# Patient Record
Sex: MALE | Race: BLACK OR AFRICAN AMERICAN | Employment: UNEMPLOYED
[De-identification: names, ages, dates, MRNs, and addresses within clinical notes are randomized per-mention and may not be internally consistent; named-entity substitution may affect disease eponyms.]

---

## 2024-04-08 ENCOUNTER — OFFICE VISIT (OUTPATIENT)
Dept: PRIMARY CARE CLINIC | Facility: CLINIC | Age: 19
End: 2024-04-08
Payer: MEDICAID

## 2024-04-08 VITALS
OXYGEN SATURATION: 99 % | HEART RATE: 104 BPM | TEMPERATURE: 98.4 F | SYSTOLIC BLOOD PRESSURE: 111 MMHG | WEIGHT: 158.7 LBS | DIASTOLIC BLOOD PRESSURE: 69 MMHG | BODY MASS INDEX: 25.51 KG/M2 | HEIGHT: 66 IN

## 2024-04-08 DIAGNOSIS — G89.29 CHRONIC RIGHT SHOULDER PAIN: ICD-10-CM

## 2024-04-08 DIAGNOSIS — Z83.49 FAMILY HISTORY OF THYROID DISEASE: ICD-10-CM

## 2024-04-08 DIAGNOSIS — M41.125 ADOLESCENT IDIOPATHIC SCOLIOSIS OF THORACOLUMBAR REGION: ICD-10-CM

## 2024-04-08 DIAGNOSIS — J30.1 SEASONAL ALLERGIC RHINITIS DUE TO POLLEN: ICD-10-CM

## 2024-04-08 DIAGNOSIS — Z76.89 ENCOUNTER TO ESTABLISH CARE WITH NEW DOCTOR: Primary | ICD-10-CM

## 2024-04-08 DIAGNOSIS — F84.0 ACTIVE AUTISTIC DISORDER: ICD-10-CM

## 2024-04-08 DIAGNOSIS — Z13.220 SCREENING FOR LIPID DISORDERS: ICD-10-CM

## 2024-04-08 DIAGNOSIS — Z83.3 FAMILY HISTORY OF DIABETES MELLITUS: ICD-10-CM

## 2024-04-08 DIAGNOSIS — R51.9 FREQUENT HEADACHES: ICD-10-CM

## 2024-04-08 DIAGNOSIS — G56.03 BILATERAL CARPAL TUNNEL SYNDROME: ICD-10-CM

## 2024-04-08 DIAGNOSIS — Z79.899 ENCOUNTER FOR LONG-TERM (CURRENT) USE OF MEDICATIONS: ICD-10-CM

## 2024-04-08 DIAGNOSIS — F90.0 ATTENTION DEFICIT HYPERACTIVITY DISORDER (ADHD), PREDOMINANTLY INATTENTIVE TYPE: ICD-10-CM

## 2024-04-08 DIAGNOSIS — M25.561 CHRONIC PAIN OF RIGHT KNEE: ICD-10-CM

## 2024-04-08 DIAGNOSIS — M25.511 CHRONIC RIGHT SHOULDER PAIN: ICD-10-CM

## 2024-04-08 DIAGNOSIS — Z80.1 FAMILY HISTORY OF LUNG CANCER: ICD-10-CM

## 2024-04-08 DIAGNOSIS — L81.9 HYPERPIGMENTATION OF SKIN: ICD-10-CM

## 2024-04-08 DIAGNOSIS — G89.29 CHRONIC PAIN OF RIGHT KNEE: ICD-10-CM

## 2024-04-08 DIAGNOSIS — E55.9 VITAMIN D DEFICIENCY: ICD-10-CM

## 2024-04-08 DIAGNOSIS — N39.0 RECURRENT UTI: ICD-10-CM

## 2024-04-08 LAB
25(OH)D3 SERPL-MCNC: 46.3 NG/ML (ref 30–100)
ALBUMIN SERPL-MCNC: 3.9 G/DL (ref 3.5–5)
ALBUMIN/GLOB SERPL: 1.1 (ref 0.4–1.6)
ALP SERPL-CCNC: 71 U/L (ref 50–136)
ALT SERPL-CCNC: 25 U/L (ref 12–65)
ANION GAP SERPL CALC-SCNC: 3 MMOL/L (ref 2–11)
AST SERPL-CCNC: 18 U/L (ref 15–37)
BASOPHILS # BLD: 0.1 K/UL (ref 0–0.2)
BASOPHILS NFR BLD: 1 % (ref 0–2)
BILIRUB SERPL-MCNC: <0.1 MG/DL (ref 0.2–1.1)
BILIRUBIN, URINE, POC: NEGATIVE
BLOOD URINE, POC: NEGATIVE
BUN SERPL-MCNC: 15 MG/DL (ref 6–23)
CALCIUM SERPL-MCNC: 10 MG/DL (ref 8.3–10.4)
CHLORIDE SERPL-SCNC: 110 MMOL/L (ref 103–113)
CHOLEST SERPL-MCNC: 184 MG/DL
CO2 SERPL-SCNC: 26 MMOL/L (ref 21–32)
CREAT SERPL-MCNC: 1 MG/DL (ref 0.8–1.5)
DIFFERENTIAL METHOD BLD: NORMAL
EOSINOPHIL # BLD: 0.1 K/UL (ref 0–0.8)
EOSINOPHIL NFR BLD: 1 % (ref 0.5–7.8)
ERYTHROCYTE [DISTWIDTH] IN BLOOD BY AUTOMATED COUNT: 11.9 % (ref 11.9–14.6)
EST. AVERAGE GLUCOSE BLD GHB EST-MCNC: 108 MG/DL
GLOBULIN SER CALC-MCNC: 3.5 G/DL (ref 2.8–4.5)
GLUCOSE SERPL-MCNC: 97 MG/DL (ref 65–100)
GLUCOSE URINE, POC: NEGATIVE
HBA1C MFR BLD: 5.4 % (ref 4.8–5.6)
HCT VFR BLD AUTO: 44.1 % (ref 41.1–50.3)
HDLC SERPL-MCNC: 52 MG/DL (ref 40–60)
HDLC SERPL: 3.5
HGB BLD-MCNC: 14.1 G/DL (ref 13.6–17.2)
IMM GRANULOCYTES # BLD AUTO: 0 K/UL (ref 0–0.5)
IMM GRANULOCYTES NFR BLD AUTO: 0 % (ref 0–5)
KETONES, URINE, POC: NORMAL
LDLC SERPL CALC-MCNC: 119.2 MG/DL
LEUKOCYTE ESTERASE, URINE, POC: NEGATIVE
LYMPHOCYTES # BLD: 3 K/UL (ref 0.5–4.6)
LYMPHOCYTES NFR BLD: 39 % (ref 13–44)
MCH RBC QN AUTO: 28.8 PG (ref 26.1–32.9)
MCHC RBC AUTO-ENTMCNC: 32 G/DL (ref 31.4–35)
MCV RBC AUTO: 90.2 FL (ref 82–102)
MONOCYTES # BLD: 0.4 K/UL (ref 0.1–1.3)
MONOCYTES NFR BLD: 5 % (ref 4–12)
NEUTS SEG # BLD: 4 K/UL (ref 1.7–8.2)
NEUTS SEG NFR BLD: 54 % (ref 43–78)
NITRITE, URINE, POC: NEGATIVE
NRBC # BLD: 0 K/UL (ref 0–0.2)
PH, URINE, POC: 5.5 (ref 4.6–8)
PLATELET # BLD AUTO: 326 K/UL (ref 150–450)
PMV BLD AUTO: 9.5 FL (ref 9.4–12.3)
POTASSIUM SERPL-SCNC: 4.2 MMOL/L (ref 3.5–5.1)
PROT SERPL-MCNC: 7.4 G/DL (ref 6.3–8.2)
PROTEIN,URINE, POC: NEGATIVE
RBC # BLD AUTO: 4.89 M/UL (ref 4.23–5.6)
SODIUM SERPL-SCNC: 139 MMOL/L (ref 136–146)
SPECIFIC GRAVITY, URINE, POC: 1.03 (ref 1–1.03)
T4 FREE SERPL-MCNC: 0.9 NG/DL (ref 0.78–1.33)
TRIGL SERPL-MCNC: 64 MG/DL (ref 35–150)
TSH, 3RD GENERATION: 1.01 UIU/ML (ref 0.36–3.74)
URINALYSIS CLARITY, POC: CLEAR
URINALYSIS COLOR, POC: YELLOW
UROBILINOGEN, POC: NORMAL
VLDLC SERPL CALC-MCNC: 12.8 MG/DL (ref 6–23)
WBC # BLD AUTO: 7.6 K/UL (ref 4.3–11.1)

## 2024-04-08 PROCEDURE — 81003 URINALYSIS AUTO W/O SCOPE: CPT | Performed by: FAMILY MEDICINE

## 2024-04-08 PROCEDURE — 99204 OFFICE O/P NEW MOD 45 MIN: CPT | Performed by: FAMILY MEDICINE

## 2024-04-08 RX ORDER — TRIAMCINOLONE ACETONIDE 1 MG/G
CREAM TOPICAL
Qty: 454 G | Refills: 1 | Status: SHIPPED | OUTPATIENT
Start: 2024-04-08

## 2024-04-08 RX ORDER — TRAZODONE HYDROCHLORIDE 50 MG/1
50 TABLET ORAL NIGHTLY
COMMUNITY
Start: 2024-03-07

## 2024-04-08 RX ORDER — CETIRIZINE HYDROCHLORIDE 10 MG/1
10 TABLET ORAL DAILY
COMMUNITY
End: 2024-04-08 | Stop reason: SDUPTHER

## 2024-04-08 RX ORDER — MELOXICAM 15 MG/1
15 TABLET ORAL DAILY
Qty: 30 TABLET | Refills: 5 | Status: SHIPPED | OUTPATIENT
Start: 2024-04-08

## 2024-04-08 RX ORDER — HYDROXYZINE PAMOATE 25 MG/1
25 CAPSULE ORAL 2 TIMES DAILY
COMMUNITY

## 2024-04-08 RX ORDER — GUANFACINE 4 MG/1
4 TABLET, EXTENDED RELEASE ORAL EVERY MORNING
COMMUNITY
Start: 2024-03-07

## 2024-04-08 RX ORDER — METHYLPHENIDATE HYDROCHLORIDE 36 MG/1
36 TABLET ORAL EVERY MORNING
COMMUNITY
Start: 2024-03-25

## 2024-04-08 RX ORDER — ERGOCALCIFEROL 1.25 MG/1
50000 CAPSULE ORAL WEEKLY
COMMUNITY
Start: 2024-03-18

## 2024-04-08 RX ORDER — KETOCONAZOLE 20 MG/G
CREAM TOPICAL DAILY
COMMUNITY
Start: 2023-06-06 | End: 2024-04-08

## 2024-04-08 RX ORDER — CLONIDINE HYDROCHLORIDE 0.2 MG/1
0.2 TABLET ORAL NIGHTLY
COMMUNITY

## 2024-04-08 RX ORDER — DEXMETHYLPHENIDATE HYDROCHLORIDE 10 MG/1
10 TABLET ORAL DAILY
COMMUNITY

## 2024-04-08 RX ORDER — KETOCONAZOLE 20 MG/ML
SHAMPOO TOPICAL
Qty: 120 ML | Refills: 5 | Status: CANCELLED | OUTPATIENT
Start: 2024-04-08

## 2024-04-08 RX ORDER — CETIRIZINE HYDROCHLORIDE 10 MG/1
10 TABLET ORAL DAILY
Qty: 30 TABLET | Refills: 5 | Status: SHIPPED | OUTPATIENT
Start: 2024-04-08

## 2024-04-08 RX ORDER — CHOLECALCIFEROL (VITAMIN D3) 125 MCG
5 CAPSULE ORAL NIGHTLY
COMMUNITY
Start: 2024-04-03

## 2024-04-08 SDOH — ECONOMIC STABILITY: FOOD INSECURITY: WITHIN THE PAST 12 MONTHS, THE FOOD YOU BOUGHT JUST DIDN'T LAST AND YOU DIDN'T HAVE MONEY TO GET MORE.: PATIENT DECLINED

## 2024-04-08 SDOH — ECONOMIC STABILITY: INCOME INSECURITY: HOW HARD IS IT FOR YOU TO PAY FOR THE VERY BASICS LIKE FOOD, HOUSING, MEDICAL CARE, AND HEATING?: PATIENT DECLINED

## 2024-04-08 SDOH — ECONOMIC STABILITY: FOOD INSECURITY: WITHIN THE PAST 12 MONTHS, YOU WORRIED THAT YOUR FOOD WOULD RUN OUT BEFORE YOU GOT MONEY TO BUY MORE.: PATIENT DECLINED

## 2024-04-08 SDOH — ECONOMIC STABILITY: HOUSING INSECURITY
IN THE LAST 12 MONTHS, WAS THERE A TIME WHEN YOU DID NOT HAVE A STEADY PLACE TO SLEEP OR SLEPT IN A SHELTER (INCLUDING NOW)?: PATIENT DECLINED

## 2024-04-08 ASSESSMENT — PATIENT HEALTH QUESTIONNAIRE - PHQ9: DEPRESSION UNABLE TO ASSESS: FUNCTIONAL CAPACITY MOTIVATION LIMITS ACCURACY

## 2024-04-08 NOTE — PROGRESS NOTES
EXAM:  Visit Vitals  /69   Pulse (!) 104   Temp 98.4 °F (36.9 °C) (Temporal)   Ht 1.664 m (5' 5.5\")   Wt 72 kg (158 lb 11.2 oz)   SpO2 99%   BMI 26.01 kg/m²        Physical Exam  Vitals and nursing note reviewed.   Constitutional:       Appearance: Normal appearance.   HENT:      Head: Normocephalic and atraumatic.      Nose: Nose normal.      Mouth/Throat:      Mouth: Mucous membranes are moist.   Eyes:      Extraocular Movements: Extraocular movements intact.      Pupils: Pupils are equal, round, and reactive to light.   Cardiovascular:      Rate and Rhythm: Normal rate and regular rhythm.      Pulses: Normal pulses.      Heart sounds: Normal heart sounds.   Pulmonary:      Effort: Pulmonary effort is normal.      Breath sounds: Normal breath sounds.   Abdominal:      General: Abdomen is flat. Bowel sounds are normal.      Palpations: Abdomen is soft.   Musculoskeletal:         General: Normal range of motion.      Right shoulder: Tenderness present. Decreased strength.      Left shoulder: Normal.      Right wrist: Tenderness present.      Left wrist: Tenderness present.      Cervical back: Normal range of motion and neck supple.      Right knee: Tenderness present.      Left knee: Normal.   Skin:     General: Skin is warm and dry.   Neurological:      General: No focal deficit present.      Mental Status: He is alert and oriented to person, place, and time.      Cranial Nerves: Cranial nerves 2-12 are intact.      Sensory: Sensation is intact.      Motor: Motor function is intact.      Coordination: Coordination is intact.      Gait: Gait is intact.   Psychiatric:         Attention and Perception: He is inattentive.         Mood and Affect: Mood is depressed. Affect is flat.         Behavior: Behavior is slowed and withdrawn.         PHQ:      4/8/2024     3:21 PM   PHQ-9    Depression Unable to Assess Functional capacity motivation limits accuracy       LABS  Results for orders placed or performed in visit

## 2024-05-05 NOTE — PROGRESS NOTES
as needed for migraine abortive therapy. May repeat for 1 dose in 2 hours if needed. Not to exceed 200mg/24 hours.     Headache Education:   Instructed the patient on over-the-counter headache management medications including: CoQ10, magnesium oxide, riboflavin and butterbur.  To avoid a pain medication overuse headache trying not to take pain medicines more than 3 doses a week.   Avoid use of Fioricet or opioids to treat headaches as this can increase risk for rebound headaches.   To help relieve headache symptoms without taking pain medicine lie down under darkroom and drink glass of water.  Consider lifestyle modification including good sleep hygiene, routine medial schedules, regular exercise and managing triggers.  Keep a headache diary  to reveal triggers and possible patterns.  Triggers may be: Food, stress, perfumes, alcohol, or even chocolate.  Drink plenty of water and try to get 8 hours of sleep each night to reduce risk factors that may cause headaches.    -     divalproex (DEPAKOTE) 250 MG DR tablet; Take 1 tablet by mouth 2 times daily    Recurrent syncope  Will obtain MRI and EEG.   Differential: syncope v. Seizure v. PNES  -     MRI BRAIN WO CONTRAST; Future  -     Ambulatory referral to Neurology    Anxiety and depression  Followed by [facility]. Stable and denies SI.   -     divalproex (DEPAKOTE) 250 MG DR tablet; Take 1 tablet by mouth 2 times daily      Follow up in 8 weeks or sooner if needed     Lizeth Basurto [facility] Neurology  [address] [place] [address], [address] [place] [address]  Phone: [phone]

## 2024-05-06 ENCOUNTER — OFFICE VISIT (OUTPATIENT)
Age: 19
End: 2024-05-06
Payer: MEDICAID

## 2024-05-06 ENCOUNTER — OFFICE VISIT (OUTPATIENT)
Dept: NEUROLOGY | Age: 19
End: 2024-05-06
Payer: MEDICAID

## 2024-05-06 VITALS — BODY MASS INDEX: 25.71 KG/M2 | WEIGHT: 160 LBS | HEIGHT: 66 IN

## 2024-05-06 VITALS
WEIGHT: 166 LBS | BODY MASS INDEX: 27.66 KG/M2 | HEIGHT: 65 IN | DIASTOLIC BLOOD PRESSURE: 83 MMHG | OXYGEN SATURATION: 97 % | HEART RATE: 118 BPM | SYSTOLIC BLOOD PRESSURE: 130 MMHG

## 2024-05-06 DIAGNOSIS — G43.709 CHRONIC MIGRAINE WITHOUT AURA WITHOUT STATUS MIGRAINOSUS, NOT INTRACTABLE: Primary | ICD-10-CM

## 2024-05-06 DIAGNOSIS — R55 RECURRENT SYNCOPE: ICD-10-CM

## 2024-05-06 DIAGNOSIS — M79.643 HAND PAIN, NOT ARTHRALGIA, UNSPECIFIED LATERALITY: Primary | ICD-10-CM

## 2024-05-06 DIAGNOSIS — F41.9 ANXIETY AND DEPRESSION: ICD-10-CM

## 2024-05-06 DIAGNOSIS — F32.A ANXIETY AND DEPRESSION: ICD-10-CM

## 2024-05-06 PROCEDURE — 99203 OFFICE O/P NEW LOW 30 MIN: CPT | Performed by: ORTHOPAEDIC SURGERY

## 2024-05-06 PROCEDURE — 99204 OFFICE O/P NEW MOD 45 MIN: CPT | Performed by: NURSE PRACTITIONER

## 2024-05-06 RX ORDER — DIVALPROEX SODIUM 250 MG/1
250 TABLET, DELAYED RELEASE ORAL 2 TIMES DAILY
Qty: 60 TABLET | Refills: 2 | Status: SHIPPED | OUTPATIENT
Start: 2024-05-06

## 2024-05-06 RX ORDER — CITALOPRAM 40 MG/1
40 TABLET ORAL DAILY
COMMUNITY

## 2024-05-06 ASSESSMENT — ENCOUNTER SYMPTOMS
VOMITING: 1
ALLERGIC/IMMUNOLOGIC NEGATIVE: 1
PHOTOPHOBIA: 1
NAUSEA: 1
RESPIRATORY NEGATIVE: 1

## 2024-05-06 ASSESSMENT — PATIENT HEALTH QUESTIONNAIRE - PHQ9
SUM OF ALL RESPONSES TO PHQ QUESTIONS 1-9: 2
SUM OF ALL RESPONSES TO PHQ QUESTIONS 1-9: 2
2. FEELING DOWN, DEPRESSED OR HOPELESS: SEVERAL DAYS
1. LITTLE INTEREST OR PLEASURE IN DOING THINGS: SEVERAL DAYS
SUM OF ALL RESPONSES TO PHQ9 QUESTIONS 1 & 2: 2
SUM OF ALL RESPONSES TO PHQ QUESTIONS 1-9: 2
SUM OF ALL RESPONSES TO PHQ QUESTIONS 1-9: 2

## 2024-05-06 NOTE — PATIENT INSTRUCTIONS
Headache Education:   Instructed the patient on over-the-counter headache management medications including: CoQ10, magnesium oxide, riboflavin, and butterbur.  To avoid a pain medication overuse headache trying not to take pain medicines more than 3 doses a week.   Avoid use of Fioricet or opioids to treat headaches as this can increase risk for rebound headaches.   To help relieve headache symptoms without taking pain medicine lie down under darkroom and drink glass of water.  Consider lifestyle modification including good sleep hygiene, routine medial schedules, regular exercise and managing triggers.  Keep a headache diary  to reveal triggers and possible patterns.  Triggers may be: Food, stress, perfumes, alcohol, or even chocolate.  Drink plenty of water and try to get 8 hours of sleep each night to reduce risk factors that may cause headaches.    Samples of Nurtec ODT 75 mg and Ubrelvy 100 mg provided to patient. Advised not to take medication on same day and to update office on efficacy.   Instructions:  Nurtec ODT 75 mg daily as needed for migraine abortive therapy. Not to exceed 75mg/24 hours.   Ubrelvy 100 mg daily as needed for migraine abortive therapy. May repeat for 1 dose in 2 hours if needed. Not to exceed 200mg/24 hours.

## 2024-05-07 NOTE — PROGRESS NOTES
Orthopaedic Hand Surgery Note    Name: Kameron Rowley  YOB: 2005  Gender: male  MRN: [id number]    CC: hand numbness      HPI: Patient is a 18 y.o. male with a chief complaint of bilateral hand cramping. He says it occurs intermittently with no particular alleviating or aggravating factors. He has difficulty describing his symptoms. He says he is unable to move the hands when this occurs. He has no numbness or tingling, and denies any finger locking or catching.  He denies any night time symptoms    ROS/Meds/PSH/PMH/FH/SH: I personally reviewed the patients standard intake form.  Pertinents are discussed In the HPI    Physical Examination:    Musculoskeletal:     Examination of the bilateral upper extremity demonstrates Normal sensation to light touch in the median distribution, normal sensation in ulnar and radial distribution, Negative carpal tunnel compression testing and Phalen testing, cap refill < 5 seconds in all fingers. Inspection reveals no thenar atrophy. Negative Tinel and elbow flexion compression test of the cubital tunnel, negative Tinel over Guyon's canal. Sensation to light touch in the ulnar 2 digits is normal with no intrinsic atrophy/weakness. No tenderness to palpation or masses noted in the forearm. No tenderness to palpation throughout the hands and wrists. Wrist and finger active and passive motion is full and pain free.     Imaging / Electrodiagnostic Tests:     none    Assessment:     ICD-10-CM    1. Hand pain, not arthralgia, unspecified laterality  M79.643 Ambulatory referral to Occupational Therapy          Plan:  We discussed the diagnosis and different treatment options. Based on the patient's description of symptoms, it sounds like he may be experiencing muscle spasms, but his complaints are fairly vague and physical exam today was completely benign. I recommended frequent breaks from repetitive activity, gentle stretching. History and physical exam is not consistent

## 2024-05-28 ENCOUNTER — OFFICE VISIT (OUTPATIENT)
Dept: ORTHOPEDIC SURGERY | Age: 19
End: 2024-05-28
Payer: MEDICAID

## 2024-05-28 DIAGNOSIS — M25.511 RIGHT SHOULDER PAIN, UNSPECIFIED CHRONICITY: Primary | ICD-10-CM

## 2024-05-28 DIAGNOSIS — M25.561 RIGHT KNEE PAIN, UNSPECIFIED CHRONICITY: ICD-10-CM

## 2024-05-28 PROCEDURE — 99204 OFFICE O/P NEW MOD 45 MIN: CPT | Performed by: ORTHOPAEDIC SURGERY

## 2024-05-28 RX ORDER — DICLOFENAC SODIUM 75 MG/1
75 TABLET, DELAYED RELEASE ORAL 2 TIMES DAILY
Qty: 28 TABLET | Refills: 0 | Status: CANCELLED | OUTPATIENT
Start: 2024-05-28

## 2024-05-28 RX ORDER — NAPROXEN 500 MG/1
500 TABLET ORAL 2 TIMES DAILY WITH MEALS
Qty: 28 TABLET | Refills: 0 | Status: SHIPPED | OUTPATIENT
Start: 2024-05-28
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
## 2024-06-10 ENCOUNTER — EVALUATION (OUTPATIENT)
Age: 19
End: 2024-06-10
Payer: MEDICAID

## 2024-06-10 DIAGNOSIS — M79.641 PAIN IN BOTH HANDS: Primary | ICD-10-CM

## 2024-06-10 DIAGNOSIS — M79.642 PAIN IN BOTH HANDS: Primary | ICD-10-CM

## 2024-06-10 DIAGNOSIS — M79.643 HAND PAIN, NOT ARTHRALGIA, UNSPECIFIED LATERALITY: ICD-10-CM

## 2024-06-10 DIAGNOSIS — R29.898 DECREASED GRIP STRENGTH: ICD-10-CM

## 2024-06-10 PROCEDURE — 97165 OT EVAL LOW COMPLEX 30 MIN: CPT | Performed by: OCCUPATIONAL THERAPIST

## 2024-06-10 PROCEDURE — 97110 THERAPEUTIC EXERCISES: CPT | Performed by: OCCUPATIONAL THERAPIST

## 2024-06-10 NOTE — PROGRESS NOTES
[facility] ORTHOPAEDICS  [address] [place] [address]  Dept: [phone]      Occupational Therapy Initial Assessment     Referring MD: Diane Sequeira MD    Diagnosis:     ICD-10-CM    1. Pain in both hands  M79.641     M79.642       2. Decreased  strength  R29.898          Therapy precautions: None    History of injury/onset : Pt began having onset of bilateral hand \"cramping\" and pain about 1 year ago, unspecified origin.     Payor: Payor: [employer] [place] MEDICAID /  /  /  Billing pattern: Government- total time   Total Direct Treatment Time: 15 min                       Total In Office Time: 40 min  Modifier needed: No  Episode visit count:  1     Preferred Name:  Kameron     PERTINENT MEDICAL HISTORY     PMHX & Meds:   Past Medical History:   Diagnosis Date    ADHD     Anxiety     Bipolar depression (HCC)     Depression     Oppositional defiant behavior     Scoliosis     Seasonal allergies    ,   Past Surgical History:   Procedure Laterality Date    BACK SURGERY  03/2022    scoliosis      Medications. : Reviewed in chart  Allergies: No Known Allergies     SUBJECTIVE     Current Symptoms/Chief complaints:   Chief Complaint   Patient presents with    Hand Pain    Finger Pain       Chief complaint/history of injury:     Number of Therapy Visits within past 90 days: 0  Nature of condition: Recurrent (multiple episodes of < 3 months)  Primary cause of current episode: Unspecified  Date symptoms began: about a year ago, pt unable to specify   Describe current symptoms: Pt hands lock up and feels like they can't move      How often do you feel symptoms? 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 A little bit  In general, would you say your current overall health is very

## 2024-07-01 ENCOUNTER — PROCEDURE VISIT (OUTPATIENT)
Dept: NEUROLOGY | Age: 19
End: 2024-07-01

## 2024-07-01 ENCOUNTER — OFFICE VISIT (OUTPATIENT)
Dept: NEUROLOGY | Age: 19
End: 2024-07-01
Payer: MEDICAID

## 2024-07-01 VITALS
WEIGHT: 168.8 LBS | HEART RATE: 104 BPM | SYSTOLIC BLOOD PRESSURE: 104 MMHG | DIASTOLIC BLOOD PRESSURE: 69 MMHG | OXYGEN SATURATION: 98 % | BODY MASS INDEX: 27.13 KG/M2 | HEIGHT: 66 IN

## 2024-07-01 DIAGNOSIS — F41.9 ANXIETY AND DEPRESSION: ICD-10-CM

## 2024-07-01 DIAGNOSIS — F32.A ANXIETY AND DEPRESSION: ICD-10-CM

## 2024-07-01 DIAGNOSIS — G43.709 CHRONIC MIGRAINE WITHOUT AURA WITHOUT STATUS MIGRAINOSUS, NOT INTRACTABLE: Primary | ICD-10-CM

## 2024-07-01 DIAGNOSIS — R55 RECURRENT SYNCOPE: ICD-10-CM

## 2024-07-01 PROCEDURE — 99214 OFFICE O/P EST MOD 30 MIN: CPT | Performed by: NURSE PRACTITIONER

## 2024-07-01 RX ORDER — DIVALPROEX SODIUM 250 MG/1
250 TABLET, DELAYED RELEASE ORAL 2 TIMES DAILY
Qty: 60 TABLET | Refills: 5 | Status: SHIPPED | OUTPATIENT
Start: 2024-07-01

## 2024-07-01 ASSESSMENT — PATIENT HEALTH QUESTIONNAIRE - PHQ9
9. THOUGHTS THAT YOU WOULD BE BETTER OFF DEAD, OR OF HURTING YOURSELF: SEVERAL DAYS
SUM OF ALL RESPONSES TO PHQ QUESTIONS 1-9: 6
1. LITTLE INTEREST OR PLEASURE IN DOING THINGS: SEVERAL DAYS
7. TROUBLE CONCENTRATING ON THINGS, SUCH AS READING THE NEWSPAPER OR WATCHING TELEVISION: NOT AT ALL
SUM OF ALL RESPONSES TO PHQ9 QUESTIONS 1 & 2: 2
SUM OF ALL RESPONSES TO PHQ QUESTIONS 1-9: 6
6. FEELING BAD ABOUT YOURSELF - OR THAT YOU ARE A FAILURE OR HAVE LET YOURSELF OR YOUR FAMILY DOWN: SEVERAL DAYS
10. IF YOU CHECKED OFF ANY PROBLEMS, HOW DIFFICULT HAVE THESE PROBLEMS MADE IT FOR YOU TO DO YOUR WORK, TAKE CARE OF THINGS AT HOME, OR GET ALONG WITH OTHER PEOPLE: SOMEWHAT DIFFICULT
8. MOVING OR SPEAKING SO SLOWLY THAT OTHER PEOPLE COULD HAVE NOTICED. OR THE OPPOSITE, BEING SO FIGETY OR RESTLESS THAT YOU HAVE BEEN MOVING AROUND A LOT MORE THAN USUAL: SEVERAL DAYS
SUM OF ALL RESPONSES TO PHQ QUESTIONS 1-9: 6
4. FEELING TIRED OR HAVING LITTLE ENERGY: SEVERAL DAYS
3. TROUBLE FALLING OR STAYING ASLEEP: NOT AT ALL
2. FEELING DOWN, DEPRESSED OR HOPELESS: SEVERAL DAYS
SUM OF ALL RESPONSES TO PHQ QUESTIONS 1-9: 5
5. POOR APPETITE OR OVEREATING: NOT AT ALL

## 2024-07-01 ASSESSMENT — COLUMBIA-SUICIDE SEVERITY RATING SCALE - C-SSRS
6. HAVE YOU EVER DONE ANYTHING, STARTED TO DO ANYTHING, OR PREPARED TO DO ANYTHING TO END YOUR LIFE?: NO
4. HAVE YOU HAD THESE THOUGHTS AND HAD SOME INTENTION OF ACTING ON THEM?: YES
5. HAVE YOU STARTED TO WORK OUT OR WORKED OUT THE DETAILS OF HOW TO KILL YOURSELF? DO YOU INTEND TO CARRY OUT THIS PLAN?: NO
2. HAVE YOU ACTUALLY HAD ANY THOUGHTS OF KILLING YOURSELF?: YES
3. HAVE YOU BEEN THINKING ABOUT HOW YOU MIGHT KILL YOURSELF?: YES
1. WITHIN THE PAST MONTH, HAVE YOU WISHED YOU WERE DEAD OR WISHED YOU COULD GO TO SLEEP AND NOT WAKE UP?: NO

## 2024-07-01 ASSESSMENT — ENCOUNTER SYMPTOMS
NAUSEA: 1
ALLERGIC/IMMUNOLOGIC NEGATIVE: 1
RESPIRATORY NEGATIVE: 1
PHOTOPHOBIA: 1
VOMITING: 1

## 2024-07-01 NOTE — PATIENT INSTRUCTIONS
Headache Education:   Instructed the patient on over-the-counter headache management medications including: CoQ10, magnesium oxide, riboflavin and butterbur.  To avoid a pain medication overuse headache trying not to take pain medicines more than 3 doses a week.   Avoid use of Fioricet or opioids to treat headaches as this can increase risk for rebound headaches.   To help relieve headache symptoms without taking pain medicine lie down under darkroom and drink glass of water.  Consider lifestyle modification including good sleep hygiene, routine medial schedules, regular exercise and managing triggers.  Keep a headache diary  to reveal triggers and possible patterns.  Triggers may be: Food, stress, perfumes, alcohol, or even chocolate.  Drink plenty of water and try to get 8 hours of sleep each night to reduce risk factors that may cause headaches.      Samples of Nurtec ODT 75 mg  provided to patient.   Instructions:  Nurtec ODT 75 mg daily as needed for migraine abortive therapy. Not to exceed 75mg/24 hours.

## 2024-07-01 NOTE — PROGRESS NOTES
ELECTROENCEPHALOGRAM FOR [facility] NEUROLOGY    EEG: Routine  Pt Class: Outpatient    DATE(s) OF EE24  DATE OF REPORT: 24    MRN: [id number]  YOB: 2005  EEG No.   ICD-10: R41.82 - Altered mental status  CPT Code: 59917 (20-40 minutes, awake and asleep)  CSN: [id number]    HISTORY: episodes    MEDICATIONS THAT COULD AFFECT EEG: valproate     TECHNICAL SUMMARY  This is a digital EEG recorded with all input channels reviewed with bipolar and referential montages using the modified combinatorial system nomenclature.    DESCRIPTION OF RECORD AND EVENTS  During the maximally alert state a 10-11 Hz posterior dominant rhythm was seen that was symmetric, reactive to eye opening and well regulated. More anteriorly, low voltage frontocentral beta predominated. Minimal myogenic artifact was seen with corresponding movements. Drowsiness was characterized by alpha attenuation and increased symmetric frontocentral theta activity. Vertex sharp transients were seen. Stage 2 sleep was characterized by symmetric sleep spindles and rare K complexes.    HV:  Hyperventilation was performed for 3 minutes with good effort. No pathologic change was seen with HV.    PHOTIC STIMULATION: Photic stimulation was done from 1-21 Hz; no photic driving was seen; photoparoxysmal responses were absent.    ELECTROCARDIOGRAM: Normal Sinus Rhythm     IMPRESSION: Normal EEG in Awake and Asleep states.    CLINICAL CORRELATION: This EEG is normal for a patient of this age during awake and asleep states. There are no asymmetries, epileptiform discharges or electrographic seizures.   An EEG without epileptiform discharges does not exclude the possibility of epilepsy. If the clinical suspicion of epilepsy remains, consider additional EEG recordings.      Fernando Zurita MD  [facility] Neurology  [address], [facility] [address]  [place] [address]  Phone: [phone]  Fax: [phone]

## 2024-07-01 NOTE — PROGRESS NOTES
tried in the past and failed: OTC NSAIDs, meloxicam, trazodone, imitrex Currently on SSRIs  - rimegepant sulfate 75 MG TBDP; Take 75 mg by mouth daily as needed (migraine)  Dispense: 16 tablet; Refill: 11  - divalproex (DEPAKOTE) 250 MG DR tablet; Take 1 tablet by mouth 2 times daily  Dispense: 60 tablet; Refill: 5    2. Recurrent syncope  No recurrent syncopal events since previous visit.   MRI of brain negative for acute abnormalities.   EEG pending.     3. Anxiety and depression  Improved.   - divalproex (DEPAKOTE) 250 MG DR tablet; Take 1 tablet by mouth 2 times daily  Dispense: 60 tablet; Refill: 5      Keep scheduled follow up appointment with Dr. Zurita at [facility] [place] for 11/4/2024    Lizeth Basurto [facility] Neurology  [address] [place] [address], [place] [address]  Phone: [phone]

## 2024-07-02 ENCOUNTER — TELEPHONE (OUTPATIENT)
Dept: NEUROLOGY | Age: 19
End: 2024-07-02

## 2024-07-02 NOTE — TELEPHONE ENCOUNTER
CC:  Bill Braden is here today for Physical   patient has had a cold since this past December. He notes symptoms are intermittent but does have slight cough at this time  Otherwise no other concerns, \"just the same old stuff\"  Medications: medications verified and updated  Refills needed today?  YES  denies Latex allergy or sensitivity  Patient would like communication of their results via:      [facility]  Tobacco history: verified  Patient's current [facility] status: Active.  Health Maintenance   Topic Date Due   • DTaP/Tdap/Td Vaccine (7 - Td) 07/21/2021   • Colorectal Cancer Screening-Colonoscopy  12/19/2021   • Influenza Vaccine  Completed          Nurtec approved from 7/02/24 thru 10/02/24.    Patient notified.

## 2024-09-16 ENCOUNTER — TREATMENT (OUTPATIENT)
Age: 19
End: 2024-09-16
Payer: MEDICAID

## 2024-09-16 DIAGNOSIS — M79.641 PAIN IN BOTH HANDS: Primary | ICD-10-CM

## 2024-09-16 DIAGNOSIS — R29.898 DECREASED GRIP STRENGTH: ICD-10-CM

## 2024-09-16 DIAGNOSIS — M79.642 PAIN IN BOTH HANDS: Primary | ICD-10-CM

## 2024-09-16 PROCEDURE — 97110 THERAPEUTIC EXERCISES: CPT | Performed by: OCCUPATIONAL THERAPIST

## 2024-10-03 DIAGNOSIS — L81.9 HYPERPIGMENTATION OF SKIN: ICD-10-CM

## 2024-10-03 RX ORDER — TRIAMCINOLONE ACETONIDE 1 MG/G
CREAM TOPICAL
Qty: 454 G | Refills: 1 | OUTPATIENT
Start: 2024-10-03

## 2024-10-08 ENCOUNTER — TELEPHONE (OUTPATIENT)
Dept: PRIMARY CARE CLINIC | Facility: CLINIC | Age: 19
End: 2024-10-08

## 2024-10-08 ENCOUNTER — OFFICE VISIT (OUTPATIENT)
Dept: PRIMARY CARE CLINIC | Facility: CLINIC | Age: 19
End: 2024-10-08
Payer: MEDICAID

## 2024-10-08 VITALS
TEMPERATURE: 97.9 F | SYSTOLIC BLOOD PRESSURE: 116 MMHG | BODY MASS INDEX: 26.65 KG/M2 | WEIGHT: 165.8 LBS | DIASTOLIC BLOOD PRESSURE: 73 MMHG | OXYGEN SATURATION: 99 % | HEIGHT: 66 IN | HEART RATE: 95 BPM

## 2024-10-08 DIAGNOSIS — M25.521 RIGHT ELBOW PAIN: ICD-10-CM

## 2024-10-08 DIAGNOSIS — K59.01 SLOW TRANSIT CONSTIPATION: ICD-10-CM

## 2024-10-08 DIAGNOSIS — Z83.49 FAMILY HISTORY OF THYROID DISEASE: ICD-10-CM

## 2024-10-08 DIAGNOSIS — F84.0 ACTIVE AUTISTIC DISORDER: ICD-10-CM

## 2024-10-08 DIAGNOSIS — R14.0 ABDOMINAL BLOATING: ICD-10-CM

## 2024-10-08 DIAGNOSIS — Z83.3 FAMILY HISTORY OF DIABETES MELLITUS: ICD-10-CM

## 2024-10-08 DIAGNOSIS — Z79.899 ENCOUNTER FOR LONG-TERM (CURRENT) USE OF MEDICATIONS: ICD-10-CM

## 2024-10-08 DIAGNOSIS — J30.1 SEASONAL ALLERGIC RHINITIS DUE TO POLLEN: ICD-10-CM

## 2024-10-08 DIAGNOSIS — K21.9 GASTROESOPHAGEAL REFLUX DISEASE WITHOUT ESOPHAGITIS: ICD-10-CM

## 2024-10-08 DIAGNOSIS — E55.9 VITAMIN D DEFICIENCY: ICD-10-CM

## 2024-10-08 DIAGNOSIS — Z30.09 VASECTOMY EVALUATION: ICD-10-CM

## 2024-10-08 DIAGNOSIS — B35.3 TINEA PEDIS OF BOTH FEET: ICD-10-CM

## 2024-10-08 DIAGNOSIS — Z00.00 ENCOUNTER FOR WELL ADULT EXAM WITHOUT ABNORMAL FINDINGS: Primary | ICD-10-CM

## 2024-10-08 DIAGNOSIS — Z13.220 SCREENING FOR LIPID DISORDERS: ICD-10-CM

## 2024-10-08 LAB
25(OH)D3 SERPL-MCNC: 35.1 NG/ML (ref 30–100)
ALBUMIN SERPL-MCNC: 3.9 G/DL (ref 3.5–5)
ALBUMIN/GLOB SERPL: 1.4 (ref 1–1.9)
ALP SERPL-CCNC: 66 U/L (ref 40–129)
ALT SERPL-CCNC: 24 U/L (ref 8–55)
ANION GAP SERPL CALC-SCNC: 10 MMOL/L (ref 9–18)
AST SERPL-CCNC: 23 U/L (ref 15–37)
BASOPHILS # BLD: 0.1 K/UL (ref 0–0.2)
BASOPHILS NFR BLD: 1 % (ref 0–2)
BILIRUB SERPL-MCNC: <0.2 MG/DL (ref 0–1.2)
BUN SERPL-MCNC: 15 MG/DL (ref 6–23)
CALCIUM SERPL-MCNC: 9.8 MG/DL (ref 8.8–10.2)
CHLORIDE SERPL-SCNC: 105 MMOL/L (ref 98–107)
CHOLEST SERPL-MCNC: 178 MG/DL (ref 0–200)
CO2 SERPL-SCNC: 27 MMOL/L (ref 20–28)
CREAT SERPL-MCNC: 1.1 MG/DL (ref 0.8–1.3)
DIFFERENTIAL METHOD BLD: ABNORMAL
EOSINOPHIL # BLD: 0.1 K/UL (ref 0–0.8)
EOSINOPHIL NFR BLD: 2 % (ref 0.5–7.8)
ERYTHROCYTE [DISTWIDTH] IN BLOOD BY AUTOMATED COUNT: 12.4 % (ref 11.9–14.6)
EST. AVERAGE GLUCOSE BLD GHB EST-MCNC: 116 MG/DL
GLOBULIN SER CALC-MCNC: 2.7 G/DL (ref 2.3–3.5)
GLUCOSE SERPL-MCNC: 115 MG/DL (ref 70–99)
HBA1C MFR BLD: 5.7 % (ref 0–5.6)
HCT VFR BLD AUTO: 44.1 % (ref 41.1–50.3)
HDLC SERPL-MCNC: 41 MG/DL (ref 40–60)
HDLC SERPL: 4.4 (ref 0–5)
HGB BLD-MCNC: 14.2 G/DL (ref 13.6–17.2)
IMM GRANULOCYTES # BLD AUTO: 0 K/UL (ref 0–0.5)
IMM GRANULOCYTES NFR BLD AUTO: 0 % (ref 0–5)
LDLC SERPL CALC-MCNC: 108 MG/DL (ref 0–100)
LYMPHOCYTES # BLD: 1.9 K/UL (ref 0.5–4.6)
LYMPHOCYTES NFR BLD: 45 % (ref 13–44)
MCH RBC QN AUTO: 28.8 PG (ref 26.1–32.9)
MCHC RBC AUTO-ENTMCNC: 32.2 G/DL (ref 31.4–35)
MCV RBC AUTO: 89.5 FL (ref 82–102)
MONOCYTES # BLD: 0.3 K/UL (ref 0.1–1.3)
MONOCYTES NFR BLD: 7 % (ref 4–12)
NEUTS SEG # BLD: 1.9 K/UL (ref 1.7–8.2)
NEUTS SEG NFR BLD: 45 % (ref 43–78)
NRBC # BLD: 0 K/UL (ref 0–0.2)
PLATELET # BLD AUTO: 302 K/UL (ref 150–450)
PMV BLD AUTO: 10 FL (ref 9.4–12.3)
POTASSIUM SERPL-SCNC: 4.6 MMOL/L (ref 3.5–5.1)
PROT SERPL-MCNC: 6.6 G/DL (ref 6.3–8.2)
RBC # BLD AUTO: 4.93 M/UL (ref 4.23–5.6)
SODIUM SERPL-SCNC: 142 MMOL/L (ref 136–145)
T4 FREE SERPL-MCNC: 1.2 NG/DL (ref 0.9–1.7)
TRIGL SERPL-MCNC: 145 MG/DL (ref 0–150)
TSH, 3RD GENERATION: 0.89 UIU/ML (ref 0.27–4.2)
VLDLC SERPL CALC-MCNC: 29 MG/DL (ref 6–23)
WBC # BLD AUTO: 4.2 K/UL (ref 4.3–11.1)

## 2024-10-08 PROCEDURE — 99395 PREV VISIT EST AGE 18-39: CPT | Performed by: FAMILY MEDICINE

## 2024-10-08 RX ORDER — KETOCONAZOLE 20 MG/G
CREAM TOPICAL
Qty: 60 G | Refills: 3 | Status: SHIPPED | OUTPATIENT
Start: 2024-10-08

## 2024-10-08 RX ORDER — LEVOCETIRIZINE DIHYDROCHLORIDE 5 MG/1
5 TABLET, FILM COATED ORAL NIGHTLY
Qty: 30 TABLET | Refills: 5 | Status: SHIPPED | OUTPATIENT
Start: 2024-10-08

## 2024-10-08 RX ORDER — PANTOPRAZOLE SODIUM 40 MG/1
40 TABLET, DELAYED RELEASE ORAL
Qty: 30 TABLET | Refills: 5 | Status: SHIPPED | OUTPATIENT
Start: 2024-10-08

## 2024-10-08 NOTE — TELEPHONE ENCOUNTER
Patient states that Prucalopride Succinate (MOTEGRITY) 2 MG tablet needs to have a prior authorization completed.

## 2024-10-08 NOTE — PROGRESS NOTES
[facility] PRIMARY CARE  Chyna Purcell M.D.  [address] [place] [id number]  Phone:  [phone]  Fax:  [phone]    ANNUAL PHYSICAL EXAM      CHIEF COMPLAINT:  Chief Complaint   Patient presents with    Annual Exam     Pt presents today for annual physical, due for labs.     Other     Pt reports that sometimes when applying pressure to right arm it feels like his elbow is going out of socket. Pt does experience pain during these times as well as decreased ROM.     Allergies     Would like to discuss switching zyrtec to Claritin due to being on Zyrtec for so long.     Foot Problem     Pt would like to be referred to podiatrist to discuss possible athletes foot, states both feets are itchy and peel.     speech therapy     Pt would like to discuss referral for speech therapy.         HISTORY OF PRESENT ILLNESS:  Mr. Rowley is a 19 y.o. male  who presents for follow up and his annual physical. His mother is present and helps to give his history. The patient, Kameron Rowley, presents with a chief complaint of right elbow pain that started 4 weeks ago. The pain is intermittent, lasting for about 20-30 minutes, and is exacerbated by movement, pivoting, and lifting. The patient reports tenderness upon palpation in the affected area and feels as if the bone is out of place. The patient mentions difficulty moving the elbow when it hurts.    Additionally, the patient has been experiencing cramping, bloating, and constipation since childhood. Bowel movements occur only once a week, with stools described as type 4 on the [place] Stool Chart, connected like a sausage but requiring significant straining. The patient denies any blood or black color in the stools. The patient reports abdominal tenderness, particularly in the epigastric area.    The patient has been taking Zyrtec for allergies since childhood, but it is no longer providing adequate relief. His mother wants to know if he can change to

## 2024-10-24 ENCOUNTER — TELEPHONE (OUTPATIENT)
Dept: PRIMARY CARE CLINIC | Facility: CLINIC | Age: 19
End: 2024-10-24

## 2024-10-24 ENCOUNTER — PATIENT MESSAGE (OUTPATIENT)
Dept: PRIMARY CARE CLINIC | Facility: CLINIC | Age: 19
End: 2024-10-24

## 2024-10-24 NOTE — TELEPHONE ENCOUNTER
Good afternoon Mrs. Purcell, this is Kameron Rowley mother. Can you give me a call please? I read Kameron test results, but I didn't understand some of the tests results. You can call me at [phone]

## 2024-11-04 ENCOUNTER — OFFICE VISIT (OUTPATIENT)
Dept: ORTHOPEDIC SURGERY | Age: 19
End: 2024-11-04
Payer: MEDICAID

## 2024-11-04 DIAGNOSIS — M25.561 RIGHT KNEE PAIN, UNSPECIFIED CHRONICITY: ICD-10-CM

## 2024-11-04 DIAGNOSIS — M25.511 RIGHT SHOULDER PAIN, UNSPECIFIED CHRONICITY: Primary | ICD-10-CM

## 2024-11-04 PROCEDURE — 99213 OFFICE O/P EST LOW 20 MIN: CPT | Performed by: PHYSICIAN ASSISTANT

## 2024-11-04 RX ORDER — NAPROXEN 500 MG/1
500 TABLET ORAL 2 TIMES DAILY WITH MEALS
Qty: 28 TABLET | Refills: 0 | Status: SHIPPED | OUTPATIENT
Start: 2024-11-04

## 2024-11-04 NOTE — PROGRESS NOTES
Name: Kameron Rowley  YOB: 2005  Gender: male  MRN: [id number]    CC: Knee Pain (R) and Shoulder Pain (R)     HPI: Kameron Rowley is a 19 y.o. male who returns for follow up on the right knee and shoulder. Mom reports he was doing much better in PT but hasn't been in the last 2 months due to running out of visits. They would like more visits today.     Physical Examination:  General: no acute distress  Lungs: breathing easily  CV: regular rhythm by pulse  Right Shoulder: Continues to have some tenderness over the AC joint.  5 out of 5 rotator cuff strength.  Full shoulder range of motion without any increased pain.  No pain with [place]'s or [place]'s today.  He does have some pain with impingement testing but he states it has improved.  Distal radius pulse 2+.  Sensation tact distally.    Right Knee: No effusion.  Ligamentously stable x 4.  Negative [place]'s over the medial lateral joint line.  Does have some tenderness along the patellar tendon.  Calf is soft nontender elevation.  Dorsi and plantarflexion mechanism intact.  Dorsalis pedis pulse 2+.    Assessment:     ICD-10-CM    1. Right shoulder pain, unspecified chronicity  M25.511 Amb External Referral To Physical Therapy     naproxen (NAPROSYN) 500 MG tablet      2. Right knee pain, unspecified chronicity  M25.561 Amb External Referral To Physical Therapy     naproxen (NAPROSYN) 500 MG tablet          Plan:     Discussed with Halley today that we will continue with formal physical therapy.  Has not seen significant improvement in the past we will continue the sessions.  Also discussed importance of continuing this at home.  We also discussed continuing the naproxen.  I will give another 2 weeks that he can use as needed.  He can come back in 6 months for reevaluation.    Stella Hair PA-C   Orthopaedics and Sports Medicine

## 2024-11-05 ENCOUNTER — TREATMENT (OUTPATIENT)
Age: 19
End: 2024-11-05
Payer: MEDICAID

## 2024-11-05 DIAGNOSIS — M79.642 PAIN IN BOTH HANDS: Primary | ICD-10-CM

## 2024-11-05 DIAGNOSIS — M79.641 PAIN IN BOTH HANDS: Primary | ICD-10-CM

## 2024-11-05 DIAGNOSIS — M79.643 HAND PAIN, NOT ARTHRALGIA, UNSPECIFIED LATERALITY: ICD-10-CM

## 2024-11-05 DIAGNOSIS — R29.898 DECREASED GRIP STRENGTH: ICD-10-CM

## 2024-11-05 PROCEDURE — 97110 THERAPEUTIC EXERCISES: CPT | Performed by: OCCUPATIONAL THERAPIST

## 2024-11-05 NOTE — PROGRESS NOTES
[facility] ORTHOPAEDICS  [address] [facility] [address]  Dept: [phone]      Occupational Therapy Daily Note     Referring MD: Friend, Diane JUSTIN MD    Diagnosis:     ICD-10-CM    1. Pain in both hands  M79.641     M79.642       2. Decreased  strength  R29.898       3. Hand pain, not arthralgia, unspecified laterality [M79.643]  M79.643            Therapy precautions: None    History of injury/onset : Pt began having onset of bilateral hand \"cramping\" and pain about 1 year ago, unspecified origin.     Payor: Payor: [employer] [place] MEDICAID /  /  /  Billing pattern: Government- total time   Total Direct Treatment Time: 30 min                       Total In Office Time: 40 min  Modifier needed: No  Episode visit count:  3     Preferred Name:  Kameron     PERTINENT MEDICAL HISTORY     PMHX & Meds:   Past Medical History:   Diagnosis Date    ADHD     Anxiety     Bipolar depression (HCC)     Depression     Oppositional defiant behavior     Scoliosis     Seasonal allergies    ,   Past Surgical History:   Procedure Laterality Date    BACK SURGERY  03/2022    scoliosis      Medications. : Reviewed in chart  Allergies: No Known Allergies     SUBJECTIVE     Pt states he is having less pain. He is able to use his hands more without difficulty.     OBJECTIVE     Functional Outcome Measures: Quick Dash  22 score=   25 % functional deficit   Reassessment 9/16/24 Functional Outcome Measure: QuickDASH        Strength  Strength (psi): RIGHT  6/10/24 LEFT  6/10/24 RIGHT  9/16/24 LEFT  9/16/24 RIGHT  11/5/24 LEFT  11/5/24    Position II 82.6 70.9 72.3 64.1 74.2 80.4   Lat Pinch: 17.3 15.8 19 20 23 22   3pt pinch: 13.1 14.4 17       16        13        16      Treatment:    Therapeutic exercise (81087) x 30 min:  Bilateral hand use with  blue resistive pins  Finding marbles in green theraputty for finger strengthening x6 marbles   Cone rotation green theraputty (bilateral) for  strengthening

## 2024-11-11 ENCOUNTER — EVALUATION (OUTPATIENT)
Age: 19
End: 2024-11-11

## 2024-11-11 ENCOUNTER — OFFICE VISIT (OUTPATIENT)
Age: 19
End: 2024-11-11
Payer: MEDICAID

## 2024-11-11 DIAGNOSIS — M79.642 PAIN IN BOTH HANDS: Primary | ICD-10-CM

## 2024-11-11 DIAGNOSIS — M79.641 PAIN IN BOTH HANDS: Primary | ICD-10-CM

## 2024-11-11 DIAGNOSIS — M25.521 RIGHT ELBOW PAIN: Primary | ICD-10-CM

## 2024-11-11 PROCEDURE — 99213 OFFICE O/P EST LOW 20 MIN: CPT | Performed by: ORTHOPAEDIC SURGERY

## 2024-11-12 NOTE — PROGRESS NOTES
Orthopaedic Hand Clinic Note    Name: Kameron Rowley  YOB: 2005  Gender: male  MRN: [id number]      CC: Patient referred for evaluation of upper extremity pain    HPI: Kameron Rowley is a 19 y.o. male with a chief complaint of right elbow cramping. He has difficulty describing his symptoms. He states that sometimes the elbow locks up and he cannot move it. He says sometimes it feels like it pops out of place. He has no history of injury.      ROS/Meds/PSH/PMH/FH/SH: I personally reviewed the patients standard intake form.  Pertinents are discussed in the HPI    Physical Examination:    Musculoskeletal Exam:  Examination on the right upper extremity demonstrates cap refill < 5 seconds in all fingers, there is no swelling, there is no tenderness to palpation throughout the elbow. Elbow motion is full and pain free. There is no elbow instability. He is unable to reproduce the locking on exam today. Patient is able to push himself up off of the exam table with his hands, without pain or evidence of instability.    Imaging / Electrodiagnostic Tests:     Elbow  XR: AP, Lateral, Oblique views     Clinical Indication:    ICD-10-CM    1. Right elbow pain  M25.521 XR ELBOW RIGHT (MIN 3 VIEWS)             Report: AP, lateral, oblique x-ray of the right elbow demonstrates no bony abnormalities    Impression: as above     Diane Sequeira MD         Assessment:     ICD-10-CM    1. Right elbow pain  M25.521 XR ELBOW RIGHT (MIN 3 VIEWS)          Plan:   We discussed the diagnosis and different treatment options. We discussed observation, therapy, antiinflammatory medications and other pertinent treatment modalities.  Once again, his complaints are fairly vague, and physical exam is benign. He would like a referral to PT, so will provide this today. He will follow up as needed      Patient voiced accordance and understanding of the information provided and the formulated plan. All questions were answered to the

## 2024-12-10 ENCOUNTER — OFFICE VISIT (OUTPATIENT)
Age: 19
End: 2024-12-10
Payer: MEDICAID

## 2024-12-10 VITALS
BODY MASS INDEX: 26.47 KG/M2 | DIASTOLIC BLOOD PRESSURE: 86 MMHG | SYSTOLIC BLOOD PRESSURE: 134 MMHG | HEART RATE: 97 BPM | OXYGEN SATURATION: 98 % | RESPIRATION RATE: 18 BRPM | WEIGHT: 164 LBS

## 2024-12-10 DIAGNOSIS — K21.9 GASTROESOPHAGEAL REFLUX DISEASE WITHOUT ESOPHAGITIS: ICD-10-CM

## 2024-12-10 DIAGNOSIS — K62.5 RECTAL BLEEDING: Primary | ICD-10-CM

## 2024-12-10 PROCEDURE — 99204 OFFICE O/P NEW MOD 45 MIN: CPT | Performed by: INTERNAL MEDICINE

## 2024-12-10 NOTE — PROGRESS NOTES
GASTROENTEROLOGY CLINIC VISIT    CC: GERD, constipation, abdominal bloating     HPI:   Kameron Rowley is 19 y.o. y/o male  presenting with his mother to discuss chronic GI issues. Since he was child he has had issues with constipation and abdominal bloating. Even with over the counter stool softeners he has a BM about once a week. He was recently started on Motegrity by PCP but has not noticed a significant improvement yet. He has also had rectal bleeding for many years.     He endorses acid reflux, recently started Protonix with improvement in symptoms.     PMH/PSH:   Autistic disorder   Oppositional defiant disorder     FMH:   Denies FMH gastric or colorectal cancer   Denies FMH autoimmune disease     SocHx:   Smoking - no   Alcohol use - no       PE:   Vitals:    12/10/24 1135   BP: 134/86   Pulse: 97   Resp: 18   SpO2: 98%      General:  The patient appears well-nourished, and is in no acute distress.    Respiratory: Respiratory effort is normal.   Cardiovascular:  Regular rate and rhythm.     Abdomen:  Soft, non tender to palpation. No distention.   Neurologic:  Alert and oriented x3.        Labs:  Lab Results   Component Value Date    HGB 14.2 10/08/2024    WBC 4.2 (L) 10/08/2024     10/08/2024    MCV 89.5 10/08/2024    CREATININE 1.10 10/08/2024    ALT 24 10/08/2024    AST 23 10/08/2024       Endoscopy:   None.     Assessment/Plan:   GERD   Constipation   Abdominal bloating   Rectal bleeding     - Schedule for EGD/colonoscopy  - Continue Motegrity   - Recommend increase in daily water and fiber intake   - Sitz baths as needed   - Continue Protonix daily   - Telemedicine visit after procedures       Brandi Fall MD  [facility]

## 2024-12-27 ENCOUNTER — PREP FOR PROCEDURE (OUTPATIENT)
Dept: GASTROENTEROLOGY | Age: 19
End: 2024-12-27

## 2024-12-27 DIAGNOSIS — K62.5 RECTAL BLEEDING: ICD-10-CM

## 2024-12-27 RX ORDER — SODIUM CHLORIDE 0.9 % (FLUSH) 0.9 %
5-40 SYRINGE (ML) INJECTION PRN
OUTPATIENT
Start: 2024-12-27

## 2024-12-27 RX ORDER — SODIUM CHLORIDE 0.9 % (FLUSH) 0.9 %
5-40 SYRINGE (ML) INJECTION EVERY 12 HOURS SCHEDULED
OUTPATIENT
Start: 2024-12-27

## 2024-12-27 RX ORDER — SODIUM CHLORIDE 9 MG/ML
25 INJECTION, SOLUTION INTRAVENOUS PRN
OUTPATIENT
Start: 2024-12-27

## 2025-02-06 ENCOUNTER — TELEPHONE (OUTPATIENT)
Age: 20
End: 2025-02-06

## 2025-02-06 NOTE — TELEPHONE ENCOUNTER
The patient was scheduled for a colonoscopy and EGD on 4/7/2025, due to provider schedule change the procedures need to be rescheduled. Called and spoke with the patient's mom and rescheduled for 5/5/2025.

## 2025-03-06 ENCOUNTER — TELEPHONE (OUTPATIENT)
Age: 20
End: 2025-03-06

## 2025-03-06 DIAGNOSIS — Z12.11 ENCOUNTER FOR SCREENING COLONOSCOPY: Primary | ICD-10-CM

## 2025-03-06 RX ORDER — SODIUM, POTASSIUM,MAG SULFATES 17.5-3.13G
1 SOLUTION, RECONSTITUTED, ORAL ORAL SEE ADMIN INSTRUCTIONS
Qty: 1 EACH | Refills: 0 | Status: SHIPPED | OUTPATIENT
Start: 2025-03-06

## 2025-03-06 NOTE — TELEPHONE ENCOUNTER
As per standing order from Dr. Fall, Suprep sent to pharmacy on file for upcoming colonoscopy on 5/5/25. Pt notified via [employer] message.

## 2025-03-31 ENCOUNTER — TELEPHONE (OUTPATIENT)
Age: 20
End: 2025-03-31

## 2025-03-31 NOTE — TELEPHONE ENCOUNTER
Patient is scheduled for a colonoscopy and EGD on 5/5/2025 with Dr. Fall. The provider is out of the office that day. Called and spoke with the patient's mom and she stated that she will call back to reschedule.

## 2025-04-07 ENCOUNTER — OFFICE VISIT (OUTPATIENT)
Dept: PRIMARY CARE CLINIC | Facility: CLINIC | Age: 20
End: 2025-04-07
Payer: MEDICAID

## 2025-04-07 VITALS
SYSTOLIC BLOOD PRESSURE: 110 MMHG | WEIGHT: 161.3 LBS | BODY MASS INDEX: 26.03 KG/M2 | TEMPERATURE: 97.4 F | HEART RATE: 84 BPM | OXYGEN SATURATION: 100 % | DIASTOLIC BLOOD PRESSURE: 71 MMHG

## 2025-04-07 DIAGNOSIS — Z83.49 FAMILY HISTORY OF THYROID DISEASE: ICD-10-CM

## 2025-04-07 DIAGNOSIS — Z13.220 SCREENING FOR LIPID DISORDERS: ICD-10-CM

## 2025-04-07 DIAGNOSIS — Z79.899 ENCOUNTER FOR LONG-TERM (CURRENT) USE OF MEDICATIONS: ICD-10-CM

## 2025-04-07 DIAGNOSIS — E55.9 VITAMIN D DEFICIENCY: ICD-10-CM

## 2025-04-07 DIAGNOSIS — Z83.3 FAMILY HISTORY OF DIABETES MELLITUS: ICD-10-CM

## 2025-04-07 DIAGNOSIS — J30.1 SEASONAL ALLERGIC RHINITIS DUE TO POLLEN: ICD-10-CM

## 2025-04-07 DIAGNOSIS — F90.0 ATTENTION DEFICIT HYPERACTIVITY DISORDER (ADHD), PREDOMINANTLY INATTENTIVE TYPE: ICD-10-CM

## 2025-04-07 DIAGNOSIS — F84.0 ACTIVE AUTISTIC DISORDER: ICD-10-CM

## 2025-04-07 DIAGNOSIS — Z00.00 ENCOUNTER FOR WELL ADULT EXAM WITHOUT ABNORMAL FINDINGS: Primary | ICD-10-CM

## 2025-04-07 PROCEDURE — 99395 PREV VISIT EST AGE 18-39: CPT | Performed by: FAMILY MEDICINE

## 2025-04-07 RX ORDER — ERGOCALCIFEROL 1.25 MG/1
50000 CAPSULE, LIQUID FILLED ORAL WEEKLY
Qty: 5 CAPSULE | Refills: 5 | Status: SHIPPED | OUTPATIENT
Start: 2025-04-07

## 2025-04-07 RX ORDER — LEVOCETIRIZINE DIHYDROCHLORIDE 5 MG/1
5 TABLET, FILM COATED ORAL NIGHTLY
Qty: 30 TABLET | Refills: 5 | Status: SHIPPED | OUTPATIENT
Start: 2025-04-07

## 2025-04-07 SDOH — ECONOMIC STABILITY: FOOD INSECURITY: WITHIN THE PAST 12 MONTHS, YOU WORRIED THAT YOUR FOOD WOULD RUN OUT BEFORE YOU GOT MONEY TO BUY MORE.: PATIENT DECLINED

## 2025-04-07 SDOH — ECONOMIC STABILITY: FOOD INSECURITY: WITHIN THE PAST 12 MONTHS, THE FOOD YOU BOUGHT JUST DIDN'T LAST AND YOU DIDN'T HAVE MONEY TO GET MORE.: PATIENT DECLINED

## 2025-04-07 ASSESSMENT — PATIENT HEALTH QUESTIONNAIRE - PHQ9: DEPRESSION UNABLE TO ASSESS: FUNCTIONAL CAPACITY MOTIVATION LIMITS ACCURACY

## 2025-04-07 NOTE — PROGRESS NOTES
type        6. Family history of thyroid disease  T4, Free    TSH      7. Family history of diabetes mellitus  Hemoglobin A1C      8. Screening for lipid disorders  Lipid Panel      9. Encounter for long-term (current) use of medications  CBC with Auto Differential    Comprehensive Metabolic Panel          Follow up and Dispositions:  Return in about 6 months (around 10/7/2025) for LABS BEFORE NEXT APPOINTMENT.     Patient will continue current medications.  Refilled the above medications.  Reviewed medications and side effects in detail.  Will check labs before next visit.  Reviewed most recent labs.  Reviewed diet, exercise and weight control.    I have reviewed the patient's past medical history, social history and family history and vitals.  We have discussed treatment plan and follow up and given patient instructions.  Patient's questions are answered and we will follow up as indicated.      Chyna Purcell MD    Dictated using voice recognition software. Proofread, but unrecognized voice recognition errors may exist.

## 2025-04-08 ENCOUNTER — TELEPHONE (OUTPATIENT)
Age: 20
End: 2025-04-08

## 2025-04-08 NOTE — TELEPHONE ENCOUNTER
Patient is scheduled for a colonoscopy and EGD on 5/5/2025 with Dr. Fall. The provider is out of the office that day. Called and spoke with the patient's mom and rescheduled for Monday 6/30/2025.

## 2025-04-09 ENCOUNTER — TELEPHONE (OUTPATIENT)
Age: 20
End: 2025-04-09

## 2025-04-09 NOTE — TELEPHONE ENCOUNTER
Pt is currently scheduled for VV this Friday 4/11/25 with Dr. Fall. Appt intended to be after pt completes EGD and colonoscopy to review results and follow up on symptoms. Procedures rescheduled to new date of 6/30/25 per note from Onofre.     Attempted to call pt to reschedule appt to later date after procedures and/or inquire if he is having any symptoms or concerns that he wants to discuss with MD as a reason for keeping appt this Friday. Called primary # in chart - no answer, VM box not set up. Called secondary # in chart - no answer, VM box not set up. Called # in chart for alternate contact, pt's mother Tosin - no answer, VM box full/unable to leave message.     [email or web address] message sent to pt about rescheduling VV this Friday.

## 2025-04-10 NOTE — TELEPHONE ENCOUNTER
Called pt again to reschedule 4/11/25 VV with Dr. Fall to new date in July after procedures.     Pt answered on 2nd attempt. Denied any concerns/worsening sx at this time, agreeable to reschedule VV as advised by provider. Cancelled 4/11 VV, rescheduled to 7/18/25 at 11:00am for VV. Pt agreeable to reach out with any concerns prior to that time.

## 2025-06-12 ENCOUNTER — TELEPHONE (OUTPATIENT)
Dept: GASTROENTEROLOGY | Age: 20
End: 2025-06-12

## 2025-06-12 NOTE — TELEPHONE ENCOUNTER
Called spoke with pt's mother she cancelled procedure. She states \" she will call back to reschedule\"    ThanksEstefania  ===View-only below this line===  ----- Message -----  From: Violette Azar  Sent: 6/12/2025   1:14 PM EDT  To: Priscila Garcia RN; Estefania Otoole  Subject: RE: Cancelation                                  Estefania please advise.  ----- Message -----  From: Priscila Garcia RN  Sent: 6/12/2025  12:32 PM EDT  To: Estefania Otoole; *  Subject: Cancelation                                      I called the number provided for the patient and the mother answered saying they need to reschedule the procedure. Instructed mother to call the office. Thanks!

## 2025-07-28 ENCOUNTER — OFFICE VISIT (OUTPATIENT)
Dept: ORTHOPEDIC SURGERY | Age: 20
End: 2025-07-28
Payer: MEDICAID

## 2025-07-28 DIAGNOSIS — M25.521 RIGHT ELBOW PAIN: ICD-10-CM

## 2025-07-28 DIAGNOSIS — M54.2 NECK PAIN: ICD-10-CM

## 2025-07-28 DIAGNOSIS — M25.511 RIGHT SHOULDER PAIN, UNSPECIFIED CHRONICITY: Primary | ICD-10-CM

## 2025-07-28 PROCEDURE — 99213 OFFICE O/P EST LOW 20 MIN: CPT | Performed by: PHYSICIAN ASSISTANT

## 2025-07-28 RX ORDER — NAPROXEN 500 MG/1
500 TABLET ORAL 2 TIMES DAILY WITH MEALS
Qty: 60 TABLET | Refills: 0 | Status: SHIPPED | OUTPATIENT
Start: 2025-07-28

## 2025-07-28 NOTE — PROGRESS NOTES
Name: Kameron Rowley  YOB: 2005  Gender: male  MRN: [id number]    CC: Shoulder Pain (R)     HPI: Kameron Rowley is a 19 y.o. male who returns for follow up on the right shoulder.  He reports continued pain into the right shoulder and arm.  His mother states they did not receive a call about physical therapy that Dr. Sequeira had planned to order.       History of Present Illness  The patient presents for evaluation of shoulder pain.    He reports experiencing shoulder pain, which he describes as having both good and bad days. He has not been attending physical therapy for the past 2 years due to a lack of response from the clinic. He expresses a preference for receiving physical therapy at this office.  He points to his neck when he describes his shoulder pain.  He notes he has good and bad days based on activity.  He is here for evaluation of right shoulder pain.     Physical Examination:  General: no acute distress  Lungs: breathing easily  CV: regular rhythm by pulse  Right Shoulder: No tenderness at AC joint.  No tenderness along the biceps tendon.  No pain with speeds.  5-5 rotator cuff strength in all planes.  Negative impingement signs.  Tender palpate along the trapezius muscle latissimus dorsi.  Motor sensor intact distally.  Distal radial pulse 2+      Assessment:     ICD-10-CM    1. Right shoulder pain, unspecified chronicity  M25.511 Ambulatory referral to Physical Therapy      2. Right elbow pain  M25.521 Ambulatory referral to Physical Therapy      3. Neck pain  M54.2 Ambulatory referral to Physical Therapy        Assessment & Plan  1. Right Shoulder pain:  The shoulder discomfort appears to be originating from the neck and surrounding muscles. The patient reports having screws and rods in the back, which might be contributing to the pain. The pain is exacerbated by certain movements and is described as radiating from the neck down to the shoulder.     A prescription for medication will